# Patient Record
Sex: FEMALE | Race: WHITE | NOT HISPANIC OR LATINO | URBAN - METROPOLITAN AREA
[De-identification: names, ages, dates, MRNs, and addresses within clinical notes are randomized per-mention and may not be internally consistent; named-entity substitution may affect disease eponyms.]

---

## 2020-07-27 ENCOUNTER — EMERGENCY (EMERGENCY)
Facility: HOSPITAL | Age: 30
LOS: 1 days | Discharge: HOME | End: 2020-07-27
Attending: EMERGENCY MEDICINE | Admitting: EMERGENCY MEDICINE
Payer: COMMERCIAL

## 2020-07-27 VITALS
RESPIRATION RATE: 17 BRPM | DIASTOLIC BLOOD PRESSURE: 62 MMHG | TEMPERATURE: 98 F | OXYGEN SATURATION: 100 % | SYSTOLIC BLOOD PRESSURE: 122 MMHG | HEART RATE: 82 BPM

## 2020-07-27 VITALS
SYSTOLIC BLOOD PRESSURE: 114 MMHG | RESPIRATION RATE: 16 BRPM | DIASTOLIC BLOOD PRESSURE: 70 MMHG | WEIGHT: 164.91 LBS | HEART RATE: 87 BPM | TEMPERATURE: 98 F | OXYGEN SATURATION: 99 %

## 2020-07-27 PROCEDURE — 99285 EMERGENCY DEPT VISIT HI MDM: CPT | Mod: 25

## 2020-07-27 PROCEDURE — 96361 HYDRATE IV INFUSION ADD-ON: CPT

## 2020-07-27 PROCEDURE — 93005 ELECTROCARDIOGRAM TRACING: CPT

## 2020-07-27 PROCEDURE — 93010 ELECTROCARDIOGRAM REPORT: CPT

## 2020-07-27 PROCEDURE — 99291 CRITICAL CARE FIRST HOUR: CPT

## 2020-07-27 PROCEDURE — 96374 THER/PROPH/DIAG INJ IV PUSH: CPT

## 2020-07-27 RX ORDER — SODIUM CHLORIDE 9 MG/ML
1000 INJECTION INTRAMUSCULAR; INTRAVENOUS; SUBCUTANEOUS ONCE
Refills: 0 | Status: COMPLETED | OUTPATIENT
Start: 2020-07-27 | End: 2020-07-27

## 2020-07-27 RX ORDER — FAMOTIDINE 10 MG/ML
20 INJECTION INTRAVENOUS ONCE
Refills: 0 | Status: COMPLETED | OUTPATIENT
Start: 2020-07-27 | End: 2020-07-27

## 2020-07-27 RX ADMIN — FAMOTIDINE 20 MILLIGRAM(S): 10 INJECTION INTRAVENOUS at 20:56

## 2020-07-27 RX ADMIN — SODIUM CHLORIDE 1000 MILLILITER(S): 9 INJECTION INTRAMUSCULAR; INTRAVENOUS; SUBCUTANEOUS at 20:52

## 2020-07-27 RX ADMIN — SODIUM CHLORIDE 1000 MILLILITER(S): 9 INJECTION INTRAMUSCULAR; INTRAVENOUS; SUBCUTANEOUS at 18:47

## 2020-07-27 NOTE — ED ADULT TRIAGE NOTE - OTHER COMPLAINTS
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 c/o sob and throat tightness. took 75mg po benadryl, went to  given 0.6ml epi and 10mg decadron. feeling improvement

## 2020-07-27 NOTE — ED PROVIDER NOTE - NS ED ATTENDING STATEMENT MOD
[place] and lactic acid was drawn on patient     Solange WRIGHT  Adam  [birth year] [id number] Attending Only

## 2020-07-27 NOTE — ED PROVIDER NOTE - OBJECTIVE STATEMENT
30 year old female with history of cashew allergy presents to ED with concern for allergic reaction to salad dressing at restaurant today.  Patient states she began to feel her lips tingling, which prompted her to take benadryl and go to urgent care.  Urgent care gave additional 50 mg benadryl (75 mg total taken pta ER arrival), decadron 10mg, and epinephren.  Patient was transported to ED for close monitoring following epi administration.  Patient notes she is feeling significantly improved.  She also states she does not have up to date rx for epipen.  She denies shortness of breath, wheezing, throat pain/irritation, chest pain, abdominal pain, nausea, emesis, changes to bowel movements, rashes or any additional acute complaints or concerns at this time.

## 2020-07-27 NOTE — ED PROVIDER NOTE - NSFOLLOWUPINSTRUCTIONS_ED_ALL_ED_FT
Please follow up with your primary physician in 1-2 days for re evaluation.  Please return to ER immediately should your symptoms worsen or if you have any concern prior to this recommended follow up.    Anaphylaxis    WHAT YOU NEED TO KNOW:    Anaphylaxis is a life-threatening allergic reaction that must be treated immediately. Your risk for anaphylaxis increases if you have asthma that is severe or not controlled. Medical conditions such as heart disease can also increase your risk. It is important to be prepared if you are at risk for anaphylaxis. Your symptoms can be worse each time you are exposed to the trigger.     DISCHARGE INSTRUCTIONS:    Steps to take for signs or symptoms of anaphylaxis:     Immediately give 1 shot of epinephrine only into the outer thigh muscle. Even if your allergic reaction seems mild, it can quickly become anaphylaxis. This may happen even if you had a mild reaction to the allergen in the past. Each exposure can cause a different reaction. Watch for signs and symptoms of anaphylaxis every time you are exposed to a trigger. Be ready to give a shot of epinephrine. It is okay to inject epinephrine through clothing. Just be careful to avoid seams, zippers, or other parts that can prevent the needle from entering your skin.How to Give An Epinephrine Shot Adult           Leave the shot in place as directed. Your healthcare provider may recommend you leave it in place for up to 10 seconds before you remove it. This helps make sure all of the epinephrine is delivered.       Call 911 and go to the emergency department, even if the shot improved symptoms. Do not drive yourself. Bring the used epinephrine shot with you.     Call 911 for any of the following:     You have a skin rash, hives, swelling, or itching.       You have trouble breathing, shortness of breath, wheezing, or coughing.      Your throat tightens or your lips or tongue swell.      You have difficulty swallowing or speaking.      You are dizzy, lightheaded, confused, or feel like you are going to faint.      You have nausea, diarrhea, or abdominal cramps, or you are vomiting.    Return to the emergency department if:     Signs or symptoms of anaphylaxis return.         Contact your healthcare provider if:     You have questions or concerns about your condition or care.        Medicines:     Epinephrine is medicine used to treat severe allergic reactions such as anaphylaxis. It is given as a shot into the outer thigh muscle.      Medicines such as antihistamines, steroids, and bronchodilators decrease inflammation, open airways, and make breathing easier.      Take your medicine as directed. Contact your healthcare provider if you think your medicine is not helping or if you have side effects. Tell him or her if you are allergic to any medicine. Keep a list of the medicines, vitamins, and herbs you take. Include the amounts, and when and why you take them. Bring the list or the pill bottles to follow-up visits. Carry your medicine list with you in case of an emergency.    Follow up with your healthcare provider as directed: Allergy testing may reveal allergies that can trigger anaphylaxis. Write down your questions so you remember to ask them during your visits.     Safety precautions:     Keep 2 shots of epinephrine with you at all times. You may need a second shot, because epinephrine only works for about 20 minutes and symptoms may return. Your healthcare provider can show you and family members how to give the shot. Check the expiration date every month and replace it before it expires.      Create an action plan. Your healthcare provider can help you create a written plan that explains the allergy and an emergency plan to treat a reaction. The plan explains when to give a second epinephrine shot if symptoms return or do not improve after the first. Give copies of the action plan and emergency instructions to family members, and work or school staff. Show them how to give a shot of epinephrine in case you are not able to give it to yourself.      Be careful when you exercise. If you have had exercise-induced anaphylaxis, do not exercise right after you eat. Stop exercising right away if you start to develop any signs or symptoms of anaphylaxis. You may first feel tired, warm, or have itchy skin. Hives, swelling, and severe breathing problems may develop if you continue to exercise.      Carry medical alert identification. Wear medical alert jewelry or carry a card that explains the allergy. Ask your healthcare provider where to get these items. Medical Alert Jewelry           Identify and avoid known triggers. Read food labels for ingredients. Look for triggers in your environment.      Ask about treatments to prevent anaphylaxis. You may need allergy shots or other medicines to treat allergies.          © Copyright [employer] 2020       back to top                      © Copyright [employer] 2020

## 2020-07-27 NOTE — ED ADULT NURSE NOTE - OBJECTIVE STATEMENT
Pt reports "I'm allergic to almonds and I had a cashew dressing," pt reports "my ears were itchy and I had hives everywhere and I felt like I couldn't breath." Pt took 75mg of Benadryl PO at home, then went to [facility] and was give epi and Decadron IM. Pt reports "I feel better except it feels like a golf ball is in my throat." Pt denies difficulty swallowing or breathing. Pt denies chest pain, sob, itching at this time.

## 2020-07-27 NOTE — ED PROVIDER NOTE - NS ED ROS FT
Constitutional: No fever or chills.   Eyes: No pain, blurry vision, or discharge.  ENMT: + Throat irritation, + lip burning.  No hearing changes, no ear pain, discharge or infections. No neck pain or stiffness.  Cardiac: No chest pain, SOB or edema. No chest pain with exertion.  Respiratory: No cough or respiratory distress. No hemoptysis. No history of asthma or RAD.  GI: No nausea, vomiting, diarrhea or abdominal pain.  : No dysuria, frequency or burning.  MS: No myalgia, muscle weakness, joint pain or back pain.  Neuro: No headache or weakness. No LOC.  Skin: No skin rash.   Endocrine: No history of thyroid disease or diabetes.  Except as documented in the HPI, all other systems are negative.

## 2020-07-27 NOTE — ED PROVIDER NOTE - CLINICAL SUMMARY MEDICAL DECISION MAKING FREE TEXT BOX
Patient in ED w concern for anaphylactic reaction to cashews today.  Patient given epi, benadryl, decadron in urgent care.  Placed on cardiac monitor on arrival to ED - given NS bolus, pepcid and monitored.  EKG completed and non ischemic, no sign of arrhythmia.  Patient monitored in ED for several hours following epinephrine injection to ensure no decline.  Patient continues to feel improved in ED and is requesting discharge home.  Will provide rx for epi and instruct patient to come to ED any time she requires its use.  Patient is advised to follow up with their PCP in 1-2 days without fail.  Patient instructed to return to ED immediately should their symptoms worsen or if there is any concern prior to the recommended PCP follow up.  Patient is aware of plan and verbalizes their understanding.  Will discharge home at this time.

## 2020-07-27 NOTE — ED ADULT NURSE REASSESSMENT NOTE - NS ED NURSE REASSESS COMMENT FT1
Report received from Js MONTES, will assume care of pt at this time. Pt in ER room 2, on cardiac monitor, NIBP, and SpO2. Assessment as noted, will continue to monitor pt.

## 2020-07-27 NOTE — ED PROVIDER NOTE - PATIENT PORTAL LINK FT
You can access the FollowMyHealth Patient Portal offered by [facility] by registering at the following website: [email or web address]. By joining [facility]’s FollowMyHealth portal, you will also be able to view your health information using other applications (apps) compatible with our system.

## 2020-07-31 DIAGNOSIS — T78.1XXA OTHER ADVERSE FOOD REACTIONS, NOT ELSEWHERE CLASSIFIED, INITIAL ENCOUNTER: ICD-10-CM

## 2021-07-23 ENCOUNTER — TRANSCRIPTION ENCOUNTER (OUTPATIENT)
Age: 31
End: 2021-07-23